# Patient Record
Sex: FEMALE | Race: WHITE | NOT HISPANIC OR LATINO | Employment: FULL TIME | URBAN - METROPOLITAN AREA
[De-identification: names, ages, dates, MRNs, and addresses within clinical notes are randomized per-mention and may not be internally consistent; named-entity substitution may affect disease eponyms.]

---

## 2022-04-11 ENCOUNTER — HISTORICAL (OUTPATIENT)
Dept: ADMINISTRATIVE | Facility: HOSPITAL | Age: 52
End: 2022-04-11

## 2022-04-29 VITALS
SYSTOLIC BLOOD PRESSURE: 116 MMHG | HEIGHT: 65 IN | WEIGHT: 149.69 LBS | BODY MASS INDEX: 24.94 KG/M2 | DIASTOLIC BLOOD PRESSURE: 70 MMHG

## 2023-02-27 ENCOUNTER — TELEPHONE (OUTPATIENT)
Dept: OBSTETRICS AND GYNECOLOGY | Facility: CLINIC | Age: 53
End: 2023-02-27

## 2023-02-27 NOTE — TELEPHONE ENCOUNTER
----- Message from Jose Manuel Lazo sent at 2/27/2023  8:13 AM CST -----  PT NEEDS MEDS SENT TO [facility] [place]. UNSURE OF MEDICATION. QUICKSALE

## 2023-03-22 ENCOUNTER — OFFICE VISIT (OUTPATIENT)
Dept: OBSTETRICS AND GYNECOLOGY | Facility: CLINIC | Age: 53
End: 2023-03-22

## 2023-03-22 VITALS
SYSTOLIC BLOOD PRESSURE: 118 MMHG | WEIGHT: 154.56 LBS | TEMPERATURE: 98 F | DIASTOLIC BLOOD PRESSURE: 72 MMHG | BODY MASS INDEX: 26.39 KG/M2 | HEIGHT: 64 IN

## 2023-03-22 DIAGNOSIS — Z30.41 ORAL CONTRACEPTIVE USE: ICD-10-CM

## 2023-03-22 DIAGNOSIS — Z12.31 ENCOUNTER FOR SCREENING MAMMOGRAM FOR BREAST CANCER: ICD-10-CM

## 2023-03-22 DIAGNOSIS — N88.2 STENOSIS OF CERVIX: ICD-10-CM

## 2023-03-22 DIAGNOSIS — Z01.411 ABNORMAL GYNECOLOGICAL EXAMINATION: Primary | ICD-10-CM

## 2023-03-22 DIAGNOSIS — Z12.4 CERVICAL CANCER SCREENING: ICD-10-CM

## 2023-03-22 DIAGNOSIS — N88.8 NABOTHIAN CYST: ICD-10-CM

## 2023-03-22 PROCEDURE — 99396 PREV VISIT EST AGE 40-64: CPT | Mod: ,,, | Performed by: NURSE PRACTITIONER

## 2023-03-22 PROCEDURE — 99396 PR PREVENTIVE VISIT,EST,40-64: ICD-10-PCS | Mod: ,,, | Performed by: NURSE PRACTITIONER

## 2023-03-22 NOTE — PROGRESS NOTES
Chief Complaint: Annual exam    Chief Complaint   Patient presents with    Well Woman     Annual.  LMP: 3/5/23 very light.  Taking Cryselle (Lo-Ovral).  Needs refills.        HPI:   52 y.o. WF  presents for an annual gyn exam.  Pt is currently taking Cryselle, desires refills.     FmHx: negative for breast, uterine, ovarian, and colon cancers.     Labs / Significant Studies:  LMP: 3/5/23   Frequency: monthly     Cycle Length: 1-2 days   Flow: light  Intermenstrual Bleeding: No  Postcoital Bleeding: No  Dysmenorrhea: No  Sexually Active: Yes   Dyspareunia: No  Contraception: OCP, Cryselle   H/o STI: No   Last pap: 20 - Neg w/ Neg HPV  H/o Abnormal Pap: Yes,  KEIRY     Family History   Problem Relation Age of Onset    Breast cancer Neg Hx     Colon cancer Neg Hx     Ovarian cancer Neg Hx     Cervical cancer Neg Hx     Uterine cancer Neg Hx          History reviewed. No pertinent past medical history.  Past Surgical History:   Procedure Laterality Date    ABCESS DRAINAGE  2020    Back       Current Outpatient Medications:     norgestrel-ethinyl estradioL (LO/OVRAL) 0.3-30 mg-mcg per tablet, Take 1 tablet by mouth once daily., Disp: 30 tablet, Rfl: 11    Review of patient's allergies indicates:  No Known Allergies    Social History     Tobacco Use    Smoking status: Never     Passive exposure: Never    Smokeless tobacco: Never   Substance Use Topics    Alcohol use: Yes     Alcohol/week: 1.0 standard drink     Types: 1 Glasses of wine per week     Comment: 1 glass wine weekly    Drug use: Never         Review of Systems   Constitutional:  Negative for appetite change, chills, fatigue, fever and unexpected weight change.   Respiratory:  Negative for cough, shortness of breath and wheezing.    Cardiovascular:  Negative for chest pain, palpitations and leg swelling.   Gastrointestinal:  Negative for abdominal pain, blood in stool, constipation, diarrhea, nausea, vomiting and reflux.   Endocrine: Negative  "for diabetes, hair loss, hot flashes, hyperthyroidism and hypothyroidism.   Genitourinary:  Negative for bladder incontinence, decreased libido, dysmenorrhea, dyspareunia, dysuria, flank pain, frequency, genital sores, hematuria, hot flashes, menorrhagia, menstrual problem, pelvic pain, urgency, vaginal bleeding, vaginal discharge, vaginal pain, urinary incontinence, postcoital bleeding, postmenopausal bleeding, vaginal dryness and vaginal odor.   Integumentary:  Negative for rash, acne, hair changes, mole/lesion, breast mass, nipple discharge, breast skin changes and breast tenderness.   Neurological:  Negative for headaches.   Psychiatric/Behavioral:  Negative for depression and sleep disturbance. The patient is not nervous/anxious.    Breast: Negative for lump, mass, mastodynia, nipple discharge, skin changes and tenderness     Physical Exam:   Vitals:    03/22/23 1357   BP: 118/72   BP Location: Left arm   Patient Position: Sitting   BP Method: Medium (Manual)   Temp: 97.5 °F (36.4 °C)   TempSrc: Temporal   Weight: 70.1 kg (154 lb 8.7 oz)   Height: 5' 3.78" (1.62 m)       Body mass index is 26.71 kg/m².    Physical Exam  Vitals reviewed. Exam conducted with a chaperone present.   Constitutional:       Appearance: She is well-developed.   Neck:      Thyroid: No thyroid mass or thyroid tenderness.   Cardiovascular:      Rate and Rhythm: Normal rate and regular rhythm.      Pulses: Normal pulses.      Heart sounds: Normal heart sounds. No murmur heard.  Pulmonary:      Effort: No respiratory distress or retractions.      Breath sounds: Normal breath sounds. No decreased breath sounds, wheezing, rhonchi or rales.   Chest:   Breasts:     Right: No inverted nipple, mass, nipple discharge, skin change or tenderness.      Left: No inverted nipple, mass, nipple discharge, skin change or tenderness.   Abdominal:      General: Bowel sounds are normal.      Palpations: There is no mass.      Tenderness: There is no " abdominal tenderness.      Hernia: No hernia is present.   Genitourinary:     General: Normal vulva.      Vagina: Normal. No vaginal discharge, erythema or tenderness.      Cervix: No discharge or friability.      Uterus: Normal. Not deviated, not enlarged, not fixed and not tender.       Adnexa: Right adnexa normal and left adnexa normal.        Right: No mass, tenderness or fullness.          Left: No mass, tenderness or fullness.        Rectum: No tenderness or external hemorrhoid.      Comments: +cervix stenotic  +Nabothian Cyst  Musculoskeletal:      Cervical back: No edema.      Right lower leg: No edema.      Left lower leg: No edema.   Lymphadenopathy:      Head:      Right side of head: No submandibular or preauricular adenopathy.      Left side of head: No submandibular or preauricular adenopathy.      Upper Body:      Right upper body: No supraclavicular or axillary adenopathy.      Left upper body: No supraclavicular or axillary adenopathy.   Skin:     General: Skin is warm and dry.      Coloration: Skin is not pale.      Findings: No erythema or rash.   Neurological:      Mental Status: She is oriented to person, place, and time.   Psychiatric:         Mood and Affect: Mood normal. Mood is not anxious or depressed.         Behavior: Behavior normal.         Thought Content: Thought content normal. Thought content does not include homicidal or suicidal ideation. Thought content does not include homicidal or suicidal plan.           Assessment:     There is no problem list on file for this patient.      Health Maintenance Due   Topic Date Due    Hepatitis C Screening  Never done    Cervical Cancer Screening  Never done    Lipid Panel  Never done    COVID-19 Vaccine (1) Never done    HIV Screening  Never done    TETANUS VACCINE  Never done    Mammogram  Never done    Hemoglobin A1c (Diabetic Prevention Screening)  Never done    Colorectal Cancer Screening  Never done    Influenza Vaccine (1) Never done     Shingles Vaccine (1 of 2) Never done     The patient has no Health Maintenance topics of status Not Due      Plan:      Cristiane was seen today for well woman.    Diagnoses and all orders for this visit:    Abnormal gynecological examination  PAP sent to [facility]   Counseled regarding contraceptive options, and need for contraception until no menses for 1 year.  Reviewed calcium needs, exercise, and prevention of osteoporosis.  Healthy diet and light weightbearing exercise if tolerated.  Reviewed normal perimenopausal transition.  Mammogram recommended yearly.  Colonoscopy recommended at age 45.  RTC 1 y    Oral contraceptive use  - Explained common options for contraception including natural family planning, barrier methods, depo-provera, ocps,  patch, nuvaring, IUD, Nexplanon, and sterilization.     - Discussed that pills should be taken at the same time every day to minimize breakthrough bleeding and to decrease failure rate.     - Advised patient that smoking is harmful due to increased risks of stroke, heart attack and blood clots when taking pills. Patient to contact us immediately if she experiences severe abdominal pain, severe chest pain, severe headaches, eye-visual changes, severe leg pain or SOB.     - Discussed that birth control, such as pills, Nuva Ring , Patch or Depo Provera, Nexplanon, IUDs do not protect against STDs.     Pt is content with Lo/Ovral. Refills sent to pharmacy.      -     norgestrel-ethinyl estradioL (LO/OVRAL) 0.3-30 mg-mcg per tablet; Take 1 tablet by mouth once daily.    Stenosis of cervix    Nabothian cyst    Encounter for screening mammogram for breast cancer  -     Mammo Digital Screening [facility] w/ [facility]; Dannie Mcguire

## 2023-04-06 ENCOUNTER — TELEPHONE (OUTPATIENT)
Dept: OBSTETRICS AND GYNECOLOGY | Facility: CLINIC | Age: 53
End: 2023-04-06

## 2023-04-06 NOTE — TELEPHONE ENCOUNTER
Spoke to patient to discuss pap - normal pap, + HPV 45 - to schedule colpo - Sabina will call patient to schedule

## 2023-04-25 ENCOUNTER — OFFICE VISIT (OUTPATIENT)
Dept: OBSTETRICS AND GYNECOLOGY | Facility: CLINIC | Age: 53
End: 2023-04-25

## 2023-04-25 VITALS
SYSTOLIC BLOOD PRESSURE: 110 MMHG | WEIGHT: 153 LBS | RESPIRATION RATE: 20 BRPM | TEMPERATURE: 97 F | BODY MASS INDEX: 26.12 KG/M2 | DIASTOLIC BLOOD PRESSURE: 62 MMHG | HEIGHT: 64 IN | HEART RATE: 91 BPM

## 2023-04-25 DIAGNOSIS — R87.810 CERVICAL HIGH RISK HPV (HUMAN PAPILLOMAVIRUS) TEST POSITIVE: ICD-10-CM

## 2023-04-25 DIAGNOSIS — B97.7 HIGH RISK HPV INFECTION: ICD-10-CM

## 2023-04-25 LAB
B-HCG UR QL: NEGATIVE
CTP QC/QA: YES

## 2023-04-25 PROCEDURE — 99499 UNLISTED E&M SERVICE: CPT | Mod: ,,, | Performed by: OBSTETRICS & GYNECOLOGY

## 2023-04-25 PROCEDURE — 57454 PR COLPOSC,CERVIX W/ADJ VAG,W/BX & CURRETAG: ICD-10-PCS | Mod: ,,, | Performed by: OBSTETRICS & GYNECOLOGY

## 2023-04-25 PROCEDURE — 57454 BX/CURETT OF CERVIX W/SCOPE: CPT | Mod: ,,, | Performed by: OBSTETRICS & GYNECOLOGY

## 2023-04-25 PROCEDURE — 81025 POCT URINE PREGNANCY: ICD-10-PCS | Mod: ,,, | Performed by: OBSTETRICS & GYNECOLOGY

## 2023-04-25 PROCEDURE — 81025 URINE PREGNANCY TEST: CPT | Mod: ,,, | Performed by: OBSTETRICS & GYNECOLOGY

## 2023-04-25 PROCEDURE — 99499 NO LOS: ICD-10-PCS | Mod: ,,, | Performed by: OBSTETRICS & GYNECOLOGY

## 2023-04-25 NOTE — PROGRESS NOTES
"Chief Complaint:  Colposcopy    Indication for Procedure:  Cristiane Moreira is a 52 y.o.  who presents for colpo secondary to normal cytology but with +HR HPV, genotype 45.    +history of abnormal pap smears. Reports history of colposcopy, no tx needed.    Denies any abnormal vaginal bleeding, vaginal discharge, pelvic or abdominal pain.       OB History          0    Para   0    Term   0       0    AB   0    Living   0         SAB   0    IAB   0    Ectopic   0    Multiple   0    Live Births   0                 Physical Exam  /62 (BP Location: Left arm, Patient Position: Sitting, BP Method: Medium (Manual))   Pulse 91   Temp 97.3 °F (36.3 °C) (Temporal)   Resp 20   Ht 5' 4" (1.626 m)   Wt 69.4 kg (153 lb)   LMP 2023   BMI 26.26 kg/m²     Constitutional: General appearance: healthy, well-nourished and well-developed  Psychiatric: Orientation to time, place and person. Normal mood and affect and active, alert  Abdomen: Auscultation/Inspection/Palpation: deferred  Female Genitalia:  Vulva: no masses, atrophy or lesions  Bladder/Urethra: no urethral discharge or mass, normal meatus, bladder   non-distended.  Vagina: no tenderness, erythema, cystocele, rectocele, abnormal  vaginal discharge, or vesicle(s) or ulcers   Cervix: no discharge or cervical motion tenderness; nabothian cyst @7oclock  Uterus: normal size and shape and midline, non-tender, and no uterine   prolapse.  Adnexa/Parametria: no parametrial tenderness or mass, no adnexal tenderness  or ovarian mass.      Procedure:   Colposcopy procedure fully reviewed. Patient questions regarding procedure and diagnosis answered. Patient draped and prepped. A speculum was placed into the vagina. The cervix and vagina were painted with acetic acid solution.    Chaperone present    Acetowhite epithelium absent  Mosaicism absent  Punctation absent  Lesion(s) clear fluid filled cyst present  Abnormal vessels absent  Leukoplakia absent  A " cervical biopsy was performed.   An ECC was performed.  Silver nitrite was applied for hemostasis. Excellent hemostasis achieved.       Assessment/Plan:  1. High risk HPV infection  Discussed the spectrum of abnormal pap smears, the relationship to HPV infection, high and low risk HPV types, cervical dysplasia, cervical cancer, and genital warts. Reviewed HPV as a sexually transmitted disease, the natural course of most infections and risk factors for infection.       Reviewed the difference between ASCUS, LGSIL and HGSIL, and management strategies for each.       Colposcopy recommended.     Patient desires colposcopy     - UPT negative       - Patient signed consents       - Current pap: NIL pap, +HR HPV 45    - Colpo: satisfactory        - ECC:  performed     - Biopsy: cervical biopsy @7oclock  @ clear cyst appears nabothian cyst     - IMPRESSION:  ARMINDA 1    - Patient tolerated procedure well.       - Advised that mild vaginal discharge may occur for 24hrs and spotting for 48hrs.       - Patient will report passage of clots, onset of profuse bleeding, foul vaginal odor, fever and/or pelvic pain.       - RTC 2 weeks for results. Patient understands importance of follow up          PAP HISTORY:   3/22/23 NIL, +HR HPV 45

## 2023-05-01 LAB — PSYCHE PATHOLOGY RESULT: NORMAL

## 2023-05-03 NOTE — PROGRESS NOTES
"Chief Complaint:  Colpo Results     History of Present Illness:  Patient is a 52 y.o.  presents to follow up cervical pathology from recent colposcopy.      LMP: 23  Frequency: q mon    Cycle Length: 1-2 days   Flow: light  Intermenstrual Bleeding: No  Postcoital Bleeding: No  Dysmenorrhea: No  Sexually Active: Yes   Dyspareunia: No  Contraception: Lo/ovral  H/o STI: No   Last pap: 3/22/23 nml; +HR HPV, +45  H/o Abnormal Pap: Yes,  KEIRY, neg HPV;  ASCUS   Colpo: 23 ECC/Cervical Bx - Benign   Gardasil: No   MM/10/20 Benign  H/o abnl MMG: NO      Review of systems:  General/Constitutional: Chills denies. Fatigue/weakness denies. Fever denies. Night sweats denies. Hot flashes denies  Gastrointestinal: Abdominal pain denies. Blood in stool denies. Constipation denies. Diarrhea denies. Heartburn denies. Nausea denies. Vomiting denies   Genitourinary: Incontinence denies. Blood in urine denies. Frequent urination denies. Urgency denies. Painful urination denies. Nocturia denies   Gynecologic: Irregular menses denies. Heavy bleeding  denies. Painful menses denies. Vaginal discharge denies.Vaginal odor denies. Vaginal itching/Irritation denies. Vaginal lesion denies.  Pelvic pain denies. Decreased libido denies. Vulvar lesion denies. Prolapse of genital organs denies. Painful intercourse denies. Postcoital bleeding denies   Psychiatric: Mood lability denies. Depressed mood denies. Suicidal thoughts denies. Anxiety denies. Overwhelmed denies. Appetite normal. Energy level normal     Past Medical History:   Diagnosis Date    Abnormal Pap smear of cervix        Current Outpatient Medications:     norgestrel-ethinyl estradioL (LO/OVRAL) 0.3-30 mg-mcg per tablet, Take 1 tablet by mouth once daily., Disp: 30 tablet, Rfl: 11      Physical Exam:  /72   Temp 97.2 °F (36.2 °C)   Ht 5' 4" (1.626 m)   Wt 70.5 kg (155 lb 6.4 oz)   LMP 2023   BMI 26.67 kg/m²     Constitutional: General " appearance: healthy, well-nourished and well-developed   Psychiatric: Orientation to time, place and person. Normal mood and affect and active, alert   Abdomen: Auscultation/Inspection/Palpation: No tenderness or masses. Soft, nondistended       Assessment/Plan:  1. Cervical high risk HPV (human papillomavirus) test positive  Educated  Reviewed pathology with patient, see below  Advised re-pap in 1 year, educated on importance of follow up  Pain/fever/bleeding/discharge precautions      Pathology 4/25/23  - ECC: benign  - Cervical biopsy @7: benign    PAP HISTORY:   3/22/23 NIL, +HR HPV 45   2011 ASCUS  2010 KEIRY -HPV

## 2023-05-10 ENCOUNTER — OFFICE VISIT (OUTPATIENT)
Dept: OBSTETRICS AND GYNECOLOGY | Facility: CLINIC | Age: 53
End: 2023-05-10

## 2023-05-10 VITALS
SYSTOLIC BLOOD PRESSURE: 120 MMHG | BODY MASS INDEX: 26.53 KG/M2 | HEIGHT: 64 IN | WEIGHT: 155.38 LBS | DIASTOLIC BLOOD PRESSURE: 72 MMHG | TEMPERATURE: 97 F

## 2023-05-10 DIAGNOSIS — R87.810 CERVICAL HIGH RISK HPV (HUMAN PAPILLOMAVIRUS) TEST POSITIVE: ICD-10-CM

## 2023-05-10 PROCEDURE — 99213 OFFICE O/P EST LOW 20 MIN: CPT | Mod: ,,, | Performed by: OBSTETRICS & GYNECOLOGY

## 2023-05-10 PROCEDURE — 99213 PR OFFICE/OUTPT VISIT, EST, LEVL III, 20-29 MIN: ICD-10-PCS | Mod: ,,, | Performed by: OBSTETRICS & GYNECOLOGY

## 2024-01-29 DIAGNOSIS — Z30.41 ORAL CONTRACEPTIVE USE: Primary | ICD-10-CM

## 2024-01-29 RX ORDER — NORGESTREL AND ETHINYL ESTRADIOL 0.3-0.03MG
1 KIT ORAL
Qty: 28 TABLET | Refills: 2 | Status: SHIPPED | OUTPATIENT
Start: 2024-01-29

## 2024-03-20 NOTE — PROGRESS NOTES
Chief Complaint: Annual exam    Chief Complaint   Patient presents with    Well Woman     Annual PAP 3/22/23 WNL +HPV +45 MMG 7/10/20 Benign        HPI:   53 y.o. F  presents for an annual gyn exam. Currently taking Cryselle for contraceptive use c cyclic menses. Pt denies complaints.     LMP: 2024  Last pap: 2023 NIL +HPV +45  MM/10/2020 benign     FmHx: negative for breast, uterine, ovarian, and colon cancers.     Labs / Significant Studies:  LMP: 2024  Frequency: q mon    Cycle Length: 1-2 days   Flow: light  Intermenstrual Bleeding: No  Postcoital Bleeding: No  Dysmenorrhea: No  Sexually Active: Yes   Dyspareunia: No  Contraception: Lo/ovral  H/o STI: No   Last pap: 3/22/23 nml; +HR HPV, +45  H/o Abnormal Pap: Yes,  KEIRY, neg HPV;  ASCUS   Colpo: 23 ECC/Cervical Bx - Benign   Gardasil: No   MM/10/20 Benign  H/o abnl MMG: NO    Family History   Problem Relation Age of Onset    Breast cancer Neg Hx     Colon cancer Neg Hx     Ovarian cancer Neg Hx     Cervical cancer Neg Hx     Uterine cancer Neg Hx          Past Medical History:   Diagnosis Date    Abnormal Pap smear of cervix      ASCUS;  KEIRY -HPV; 3/22/23 WNL +HPV, +45     Past Surgical History:   Procedure Laterality Date    ABCESS DRAINAGE  2020    Back    COLPOSCOPY      23 ECC/Cervical Bx Benign       Current Outpatient Medications:     norgestrel-ethinyl estradioL (CRYSELLE, Stanislav,) 0.3-30 mg-mcg per tablet, TAKE ONE TABLET BY MOUTH EVERY DAY, Disp: 28 tablet, Rfl: 2    Review of patient's allergies indicates:  No Known Allergies    Social History     Tobacco Use    Smoking status: Never     Passive exposure: Never    Smokeless tobacco: Never   Substance Use Topics    Alcohol use: Yes     Alcohol/week: 1.0 standard drink of alcohol     Types: 1 Glasses of wine per week     Comment: 1 glass wine weekly    Drug use: Never       Review of Systems:  General/Constitutional: Chills denies.  "Fatigue/weakness denies. Fever denies. Night sweats denies. Hot flashes denies    Respiratory: Cough denies. Hemoptysis denies. SOB denies. Sputum production denies. Wheezing denies .   Cardiovascular: Chest pain denies . Dizziness denies. Palpitations denies. Swelling in hands/feet denies    Gastrointestinal: Abdominal pain denies. Blood in stool denies. Constipation denies. Diarrhea denies. Heartburn denies. Nausea denies. Vomiting denies    Genitourinary: Incontinence denies. Blood in urine denies. Frequent urination denies. Painful urination denies. Urinary urgency denies. Nocturia denies    Gynecologic: Irregular menses denies. Heavy bleeding denies. Painful menses denies. Vaginal discharge denies. Vaginal odor denies. Vaginal itching denies. Vaginal lesion denies. Pelvic pain denies. Decreased libido denies. Vulvar lesion denies. Prolapse of genital organs denies. Painful intercourse denies. Postcoital bleeding denies    Psychiatric: Depression denies. Anxiety denies     Physical Exam:   Vitals:    04/02/24 1515   BP: 122/74   Temp: 97.5 °F (36.4 °C)   Weight: 71.2 kg (157 lb)   Height: 5' 4" (1.626 m)       Body mass index is 26.95 kg/m².       Chaperone: present.     General appearance: healthy, well-nourished and well-developed     Psychiatric: Orientation to time, place and person. Normal mood and affect and active, alert     Skin: Appearance: no rashes or lesions.     Neck:   Neck: supple, FROM, trachea midline. and no masses   Thyroid: no enlargement or nodules and non-tender.       Cardiovascular:   Auscultation: RRR and no murmur.   Peripheral Vascular: no varicosities, LLE edema, RLE edema, calf tenderness, and palpable cord and pedal pulses intact.     Lungs:   Respiratory effort: no intercostal retractions or accessory muscle usage.   Auscultation: no wheezing, rales/crackles, or rhonchi and clear to auscultation.     Breast:   Inspection/Palpation: no tenderness, discrete/distinct masses, skin " changes, or abnormal secretions. Nipple appearance normal.     Abdomen:   Auscultation/Inspection/Palpation: no hepatomegaly, splenomegaly, masses, tenderness or CVA tenderness and soft, non-distended bowel sounds preset.    Hernia: no palpable hernias.     Female Genitalia:    Vulva: no masses, tenderness or lesions    Bladder/Urethra: no urethral discharge or mass, normal meatus, bladder non-distended.    Vagina: no tenderness, erythema, cystocele, rectocele, abnormal vaginal discharge or vesicle(s) or ulcers    Cervix: no discharge, no cervical lacerations noted or motion tenderness and grossly normal. Stenotic   Uterus: normal size and shape and midline, non-tender, and no uterine prolapse.    Adnexa/Parametria: no parametrial tenderness or mass, no adnexal tenderness or ovarian mass.     Lymph Nodes:   Palpation: non tender submandibular nodes, axillary nodes, or inguinal nodes.     Rectal Exam:   Rectum: normal perianal skin.       Assessment:     Patient Active Problem List   Diagnosis    High risk HPV infection       Health Maintenance Due   Topic Date Due    Hepatitis C Screening  Never done    Lipid Panel  Never done    COVID-19 Vaccine (1) Never done    HIV Screening  Never done    TETANUS VACCINE  Never done    Mammogram  Never done    Hemoglobin A1c (Diabetic Prevention Screening)  Never done    Colorectal Cancer Screening  Never done    Shingles Vaccine (1 of 2) Never done    Influenza Vaccine (1) Never done     Health Maintenance Topics with due status: Not Due       Topic Last Completion Date    Cervical Cancer Screening 03/31/2023         Plan:    Cristiane was seen today for well woman.    Diagnoses and all orders for this visit:    Abnormal gynecological examination  PAP   Reviewed calcium needs, exercise, and prevention of osteoporosis.  Healthy diet  Annual MMG  Discussed breast self-awareness  Colonoscopy q 10 yrs  Reviewed normal menopause and menopausal symptoms  RTC 1 yr    Encounter for  surveillance of contraceptive pills  Finish current pack of Cryselle then d/c x 1 month and have hormonal labs drawn    - Explained common options for contraception including natural family planning, barrier methods, depo-provera, ocps,  patch, nuvaring, IUD, Nexplanon, and sterilization.     - Discussed that pills should be taken at the same time every day to minimize breakthrough bleeding and to decrease failure rate.     - Advised patient that smoking is harmful due to increased risks of stroke, heart attack and blood clots when taking pills. Patient to contact us immediately if she experiences severe abdominal pain, severe chest pain, severe headaches, eye-visual changes, severe leg pain or SOB.     - Discussed that birth control, such as pills, Nuva Ring , Patch or Depo Provera, Nexplanon, IUDs do not protect against STDs.     Pt is content with Cryselle.       Perimenopausal  FSH   Estradiol    Will call pt c lab results.     High risk HPV infection  - HPV is a common viral infection that manifests in some patients as anogenital warts.      - HPV infection is acquired through direct genital contact.     - Educated she may be at risk for other sexually transmitted diseases     Encounter for screening mammogram for breast cancer  - Discussed recommendations of annual screening after age of 40 with mammogram and MRI for patients with lifetime risk greater than 25%     - Explained that screening is not 100% reliable.  Advised patient if she notices any changes to her breast including a lump, mass, dimpling, discharge, rash, or tenderness she should contact us immediately.     - Recommend monthly BSE     This note was transcribed by Alyse Holder MA. There may be transcription errors as a result, however minimal. Effort has been made to ensure accuracy of transcription, but any obvious errors or omissions should be clarified with the author of the document.

## 2024-04-02 ENCOUNTER — OFFICE VISIT (OUTPATIENT)
Dept: OBSTETRICS AND GYNECOLOGY | Facility: CLINIC | Age: 54
End: 2024-04-02

## 2024-04-02 VITALS
TEMPERATURE: 98 F | SYSTOLIC BLOOD PRESSURE: 122 MMHG | WEIGHT: 157 LBS | BODY MASS INDEX: 26.8 KG/M2 | HEIGHT: 64 IN | DIASTOLIC BLOOD PRESSURE: 74 MMHG

## 2024-04-02 DIAGNOSIS — B97.7 HIGH RISK HPV INFECTION: ICD-10-CM

## 2024-04-02 DIAGNOSIS — Z01.411 ABNORMAL GYNECOLOGICAL EXAMINATION: Primary | ICD-10-CM

## 2024-04-02 DIAGNOSIS — Z12.31 ENCOUNTER FOR SCREENING MAMMOGRAM FOR BREAST CANCER: ICD-10-CM

## 2024-04-02 DIAGNOSIS — Z12.4 CERVICAL CANCER SCREENING: ICD-10-CM

## 2024-04-02 DIAGNOSIS — N95.1 PERIMENOPAUSAL: ICD-10-CM

## 2024-04-02 DIAGNOSIS — Z30.41 ENCOUNTER FOR SURVEILLANCE OF CONTRACEPTIVE PILLS: ICD-10-CM

## 2024-04-02 PROCEDURE — 99396 PREV VISIT EST AGE 40-64: CPT | Mod: ,,, | Performed by: NURSE PRACTITIONER

## 2024-04-02 PROCEDURE — 87624 HPV HI-RISK TYP POOLED RSLT: CPT | Performed by: NURSE PRACTITIONER

## 2024-04-08 LAB
HIGH RISK HPV: NEGATIVE
PSYCHE PATHOLOGY RESULT: NORMAL

## 2024-04-08 NOTE — PROGRESS NOTES
Hey,  Your pap smear negative for intraepithelial lesion or malignancy, which means it is normal and negative for cancer.  The HPV testing that we do on pap smears is also negative.  We will  repeat your pap smear in 1 year.  Call the office if you have any problems.  Enjoy your day!    Michaela

## 2024-06-26 ENCOUNTER — LAB VISIT (OUTPATIENT)
Dept: LAB | Facility: HOSPITAL | Age: 54
End: 2024-06-26
Attending: NURSE PRACTITIONER

## 2024-06-26 ENCOUNTER — TELEPHONE (OUTPATIENT)
Dept: OBSTETRICS AND GYNECOLOGY | Facility: CLINIC | Age: 54
End: 2024-06-26

## 2024-06-26 DIAGNOSIS — N95.1 PERIMENOPAUSAL: ICD-10-CM

## 2024-06-26 DIAGNOSIS — Z30.41 ORAL CONTRACEPTIVE USE: ICD-10-CM

## 2024-06-26 LAB
ESTRADIOL SERPL HS-MCNC: 387 PG/ML
FSH SERPL-ACNC: 3.47 MIU/ML

## 2024-06-26 PROCEDURE — 36415 COLL VENOUS BLD VENIPUNCTURE: CPT

## 2024-06-26 PROCEDURE — 82670 ASSAY OF TOTAL ESTRADIOL: CPT

## 2024-06-26 PROCEDURE — 83001 ASSAY OF GONADOTROPIN (FSH): CPT

## 2024-06-26 NOTE — PROGRESS NOTES
Looks like patient still ovulatory, estrogen high and FSH low - please notify patient that she can still get pregnant by looking at these results.

## 2024-06-26 NOTE — TELEPHONE ENCOUNTER
----- Message from Michaela Mcguire NP sent at 6/26/2024  3:43 PM CDT -----  Looks like patient still ovulatory, estrogen high and FSH low - please notify patient that she can still get pregnant by looking at these results.

## 2024-06-26 NOTE — TELEPHONE ENCOUNTER
Spoke with pt and let her know results, she is is requesting to have birthcontrol refilled. Informed pt that we would ask mrs moyer and refill if appropriate and call her after.

## 2024-06-27 RX ORDER — NORGESTREL AND ETHINYL ESTRADIOL 0.3-0.03MG
1 KIT ORAL DAILY
Qty: 28 TABLET | Refills: 11 | Status: SHIPPED | OUTPATIENT
Start: 2024-06-27

## 2024-06-27 NOTE — TELEPHONE ENCOUNTER
Spoke with patient who denies any tobacco /vaping use.  No cardiac history.  Patient is still ovulatory according to TRISTAN Mcguire NP after reviewing labs.  Refills on Cryselle sent to lab for patient to start with next cycle.  Patient voiced understanding and agrees with plan of care.

## 2025-04-08 ENCOUNTER — OFFICE VISIT (OUTPATIENT)
Dept: OBSTETRICS AND GYNECOLOGY | Facility: CLINIC | Age: 55
End: 2025-04-08
Payer: COMMERCIAL

## 2025-04-08 VITALS
DIASTOLIC BLOOD PRESSURE: 76 MMHG | WEIGHT: 159.38 LBS | SYSTOLIC BLOOD PRESSURE: 120 MMHG | TEMPERATURE: 98 F | HEIGHT: 64 IN | BODY MASS INDEX: 27.21 KG/M2

## 2025-04-08 DIAGNOSIS — R87.810 CERVICAL HIGH RISK HPV (HUMAN PAPILLOMAVIRUS) TEST POSITIVE: ICD-10-CM

## 2025-04-08 DIAGNOSIS — Z30.41 ORAL CONTRACEPTIVE USE: ICD-10-CM

## 2025-04-08 DIAGNOSIS — Z12.31 SCREENING MAMMOGRAM FOR BREAST CANCER: ICD-10-CM

## 2025-04-08 DIAGNOSIS — Z01.419 ROUTINE GYNECOLOGICAL EXAMINATION: Primary | ICD-10-CM

## 2025-04-08 DIAGNOSIS — Z12.4 CERVICAL CANCER SCREENING: ICD-10-CM

## 2025-04-08 DIAGNOSIS — N95.1 PERIMENOPAUSAL: ICD-10-CM

## 2025-04-08 PROCEDURE — 1160F RVW MEDS BY RX/DR IN RCRD: CPT | Mod: CPTII,,, | Performed by: NURSE PRACTITIONER

## 2025-04-08 PROCEDURE — 3078F DIAST BP <80 MM HG: CPT | Mod: CPTII,,, | Performed by: NURSE PRACTITIONER

## 2025-04-08 PROCEDURE — 1159F MED LIST DOCD IN RCRD: CPT | Mod: CPTII,,, | Performed by: NURSE PRACTITIONER

## 2025-04-08 PROCEDURE — 3008F BODY MASS INDEX DOCD: CPT | Mod: CPTII,,, | Performed by: NURSE PRACTITIONER

## 2025-04-08 PROCEDURE — 99396 PREV VISIT EST AGE 40-64: CPT | Mod: ,,, | Performed by: NURSE PRACTITIONER

## 2025-04-08 PROCEDURE — 3074F SYST BP LT 130 MM HG: CPT | Mod: CPTII,,, | Performed by: NURSE PRACTITIONER

## 2025-04-08 RX ORDER — NORGESTREL AND ETHINYL ESTRADIOL 0.3-0.03MG
1 KIT ORAL DAILY
Qty: 28 TABLET | Refills: 11 | Status: SHIPPED | OUTPATIENT
Start: 2025-04-08

## 2025-04-08 NOTE — PROGRESS NOTES
Chief Complaint: Annual exam    Chief Complaint   Patient presents with    Well Woman       HPI:    54 y.o. F  presents for an annual gyn exam.  Currently on Cryselle ocps. Reports short light cycles with Cryselle.      PAP HISTORY:   3/22/23 NIL, +HR HPV 45    ASCUS   KEIRY -HPV  Pathology 23  - ECC: benign  - Cervical biopsy @7: benign      Cancer-related family history includes Prostate cancer (age of onset: 53) in her brother. There is no history of Breast cancer, Ovarian cancer, Cervical cancer, or Uterine cancer.       Labs / Significant Studies:  Gyn History:    Menstrual History  Cycle: Yes  Menarche Age: 16 years  Flow Duration:  (1-2 days)  Flow: Light  Interval: 28  Intermenstrual Bleeding: No  Dysmenorrhea: No    Menopause  Menopause Age: 2 years    Pap History  Last pap date: 24 (NIL -HPV)  Result: Normal  History of Abnormal Pap: (!) Yes ( KEIRY, neg HPV;  ASCUS   Colpo: 23 ECC/Cervical Bx - Benign)  Treatment used: Colpo  HPV Vaccine Completed: No (0/3)    [place]  Sexually Active: Yes  Sexual Orientation: heterosexual  Postcoital Bleeding: No  Dyspareunia: No  STI History: No  Contraception: Yes  Contraception Type: Oral contraceptives (Cryselle)    Breast History  Last Breast Imaging Date: Yes  Date: 07/10/20 (Benign)  History of Abnormal Breast Imaging : No  History of Breast Biopsy: No    Family History   Problem Relation Name Age of Onset    Prostate cancer Brother  53    Breast cancer Neg Hx      Colon cancer Neg Hx      Ovarian cancer Neg Hx      Cervical cancer Neg Hx      Uterine cancer Neg Hx           Past Medical History:   Diagnosis Date    Abnormal Pap smear of cervix      ASCUS;  KEIRY -HPV; 3/22/23 WNL +HPV, +45     Past Surgical History:   Procedure Laterality Date    [facility] DRAINAGE  2020    Back    COLPOSCOPY      23 ECC/Cervical Bx Benign     Current Medications[1]    Review of patient's allergies indicates:  No Known  "Allergies    Social History[2]    Review of Systems:  General/Constitutional: Chills denies. Fatigue/weakness denies. Fever denies. Night sweats denies. Hot flashes denies    Respiratory: Cough denies. Hemoptysis denies. SOB denies. Sputum production denies. Wheezing denies .   Cardiovascular: Chest pain denies . Dizziness denies. Palpitations denies. Swelling in hands/feet denies    Gastrointestinal: Abdominal pain denies. Blood in stool denies. Constipation denies. Diarrhea denies. Heartburn denies. Nausea denies. Vomiting denies    Genitourinary: Incontinence denies. Blood in urine denies. Frequent urination denies. Painful urination denies. Urinary urgency denies. Nocturia denies    Gynecologic: Irregular menses denies. Heavy bleeding denies. Painful menses denies. Vaginal discharge denies. Vaginal odor denies. Vaginal itching denies. Vaginal lesion denies. Pelvic pain denies. Decreased libido denies. Vulvar lesion denies. Prolapse of genital organs denies. Painful intercourse denies. Postcoital bleeding denies    Psychiatric: Depression denies. Anxiety denies     Physical Exam:   Vitals:    04/08/25 1449   BP: 120/76   Temp: 97.5 °F (36.4 °C)   Weight: 72.3 kg (159 lb 6.4 oz)   Height: 5' 4" (1.626 m)       Body mass index is 27.36 kg/m².       Chaperone: present.     General appearance: healthy, well-nourished and well-developed     Psychiatric: Orientation to time, place and person. Normal mood and affect and active, alert     Skin: Appearance: no rashes or lesions.     Neck:   Neck: supple, FROM, trachea midline. and no masses   Thyroid: no enlargement or nodules and non-tender.       Cardiovascular:   Auscultation: RRR and no murmur.   Peripheral Vascular: no varicosities, LLE edema, RLE edema, calf tenderness, and palpable cord and pedal pulses intact.     Lungs:   Respiratory effort: no intercostal retractions or accessory muscle usage.   Auscultation: no wheezing, rales/crackles, or rhonchi and clear to " auscultation.     Breast:   Inspection/Palpation: no tenderness, discrete/distinct masses, skin changes, or abnormal secretions. Nipple appearance normal.     Abdomen:   Auscultation/Inspection/Palpation: no hepatomegaly, splenomegaly, masses, tenderness or CVA tenderness and soft, non-distended bowel sounds preset.    Hernia: no palpable hernias.     Female Genitalia:    Vulva: no masses, tenderness or lesions    Bladder/Urethra: no urethral discharge or mass, normal meatus, bladder non-distended.    Vagina: no tenderness, erythema, cystocele, rectocele, abnormal vaginal discharge or vesicle(s) or ulcers    Cervix: no discharge, no cervical lacerations noted or motion tenderness and grossly normal    Uterus: normal size and shape and midline, non-tender, and no uterine prolapse.    Adnexa/Parametria: no parametrial tenderness or mass, no adnexal tenderness or ovarian mass.     Lymph Nodes:   Palpation: non tender submandibular nodes, axillary nodes, or inguinal nodes.     Rectal Exam:   Rectum: normal perianal skin.       Assessment:     Problem List[3]    Health Maintenance Due   Topic Date Due    Hepatitis C Screening  Never done    Lipid Panel  Never done    HIV Screening  Never done    TETANUS VACCINE  Never done    Mammogram  Never done    Hemoglobin A1c (Diabetic Prevention Screening)  Never done    Colorectal Cancer Screening  Never done    Shingles Vaccine (1 of 2) Never done    Pneumococcal Vaccines (Age 50+) (1 of 1 - PCV) Never done    Influenza Vaccine (1) Never done    COVID-19 Vaccine (1 - 2024-25 season) Never done    Cervical Cancer Screening  04/02/2025     Health Maintenance Topics with due status: Not Due       Topic Last Completion Date    RSV Vaccine (Age 60+ and Pregnant patients) Not Due         Plan:    Cristiane was seen today for well woman.    Diagnoses and all orders for this visit:    Routine gynecological examination  PAP   Reviewed calcium needs, exercise, and prevention of  osteoporosis.  Healthy diet  Annual MMG  Discussed breast self-awareness  Colonoscopy q 10 yrs  Reviewed normal menopause and menopausal symptoms  Strongly encouraged Shingles vaccination  RTC 1 yr   Oral contraceptive use  - Explained common options for contraception including natural family planning, barrier methods, depo-provera, ocps,  patch, nuvaring, IUD, Nexplanon, and sterilization.     - Discussed that pills should be taken at the same time every day to minimize breakthrough bleeding and to decrease failure rate.     - Advised patient that smoking is harmful due to increased risks of stroke, heart attack and blood clots when taking pills. Patient to contact us immediately if she experiences severe abdominal pain, severe chest pain, severe headaches, eye-visual changes, severe leg pain or SOB.     - Discussed that birth control, such as pills, Nuva Ring , Patch or Depo Provera, Nexplanon, IUDs do not protect against STDs.     D/c Cryselle after current pack and do labs in 1 month.  To call for results and further management  -     norgestrel-ethinyl estradioL (CRYSELLE, Stanislav,) 0.3-30 mg-mcg per tablet; Take 1 tablet by mouth once daily.    Screening mammogram for breast cancer  -     Cancel: Mammo Digital Screening Bilat w/ Mello (XPD); Future  -     Mammo Digital Screening Bilat w/ Mello (XPD); Future  -     Mammo Digital Screening Bilat w/ Mello (XPD)    Cervical cancer screening  -     Liquid-Based Pap Smear, Screening    Perimenopausal  To do FSH and estradiol levels-if patient is still with ovarian function we will restart birth control pills.  -     Follicle Stimulating Hormone; Future  -     Estradiol; Future    Cervical high risk HPV (human papillomavirus) test positive                  [1]   Current Outpatient Medications:     norgestrel-ethinyl estradioL (CRYSELLE, 28,) 0.3-30 mg-mcg per tablet, Take 1 tablet by mouth once daily., Disp: 28 tablet, Rfl: 11  [2]   Social History  Tobacco Use    Smoking  status: Never     Passive exposure: Never    Smokeless tobacco: Never   Substance Use Topics    Alcohol use: Yes     Alcohol/week: 1.0 standard drink of alcohol     Types: 1 Glasses of wine per week     Comment: 1 glass wine weekly    Drug use: Never   [3]   Patient Active Problem List  Diagnosis    High risk HPV infection    Cervical high risk HPV (human papillomavirus) test positive

## 2025-04-15 ENCOUNTER — RESULTS FOLLOW-UP (OUTPATIENT)
Dept: OBSTETRICS AND GYNECOLOGY | Facility: CLINIC | Age: 55
End: 2025-04-15